# Patient Record
Sex: FEMALE | Race: BLACK OR AFRICAN AMERICAN | NOT HISPANIC OR LATINO | Employment: STUDENT | URBAN - METROPOLITAN AREA
[De-identification: names, ages, dates, MRNs, and addresses within clinical notes are randomized per-mention and may not be internally consistent; named-entity substitution may affect disease eponyms.]

---
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
## 2024-04-23 ENCOUNTER — OFFICE VISIT (OUTPATIENT)
Dept: OBSTETRICS AND GYNECOLOGY | Age: 19
End: 2024-04-23
Payer: COMMERCIAL

## 2024-04-23 VITALS
HEIGHT: 61 IN | DIASTOLIC BLOOD PRESSURE: 74 MMHG | BODY MASS INDEX: 36.17 KG/M2 | WEIGHT: 191.6 LBS | SYSTOLIC BLOOD PRESSURE: 112 MMHG

## 2024-04-23 DIAGNOSIS — Z01.419 ENCOUNTER FOR GYNECOLOGICAL EXAMINATION: Primary | ICD-10-CM

## 2024-04-23 DIAGNOSIS — Z20.2 EXPOSURE TO STD: ICD-10-CM

## 2024-04-23 RX ORDER — CETIRIZINE HYDROCHLORIDE 10 MG/1
10 TABLET ORAL DAILY
COMMUNITY

## 2024-04-23 NOTE — PROGRESS NOTES
"Subjective     Chief Complaint   Patient presents with    Gynecologic Exam     Annual exam, Last Annual 2023, Pt has no complaints today, Doing well        History of Present Illness    Amarilis Mak is a 18 y.o.  who presents for annual exam.  Her menses are monthly about every 30 days;  lasting 4-7 days, dysmenorrhea none     She stopped ocp's as she is no longer in relationship with a male. She would like STD screening today.  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     Obstetric History:  OB History          0    Para   0    Term   0       0    AB   0    Living   0         SAB   0    IAB   0    Ectopic   0    Molar   0    Multiple   0    Live Births   0               Menstrual History:     Patient's last menstrual period was 2024 (exact date).         Current contraception: currently female partners  History of abnormal Pap smear:  N/A  Received Gardasil immunization: no  Perform regular self breast exam: no  Family history of uterine or ovarian cancer: no  Family History of colon cancer: no  Family history of breast cancer: no    Mammogram: not indicated.  Colonoscopy: not indicated.  DEXA: not indicated.    Exercise: moderately active  Calcium/Vitamin D: adequate intake    The following portions of the patient's history were reviewed and updated as appropriate: allergies, current medications, past family history, past medical history, past social history, past surgical history, and problem list.    Review of Systems    Review of Systems   Constitutional: Negative for fatigue.   Respiratory: Negative for shortness of breath.    Gastrointestinal: Negative for abdominal pain.   Genitourinary: Negative for abnormal bleeding  Neurological: Negative for severe headaches.   Psychiatric/Behavioral: Negative for dysphoric mood.         Objective   Physical Exam    /74   Ht 154.9 cm (61\")   Wt 86.9 kg (191 lb 9.6 oz)   LMP 2024 " (Exact Date)   BMI 36.20 kg/m²   General:   Alert, in no distress   Heart: regular rate and rhythm   Lungs: clear to auscultation bilaterally   Breast: Inspection is negative. Left breast is without masses, retractions, nipple discharge or axillary adenopathy. Right breast is without masses, retractions, nipple discharge or axillary adenopathy.     Neck: Supple, no thyromegaly   Abdomen: Soft, no tenderness or guarding   Pelvis: External genitalia: normal general appearance  Urinary system: urethral meatus normal  Vaginal: normal mucosa without prolapse or lesions  Cervix: normal appearance  Adnexa: no masses or tenderness  Uterus: normal, nontender   Extremities: Normal without edema   Neurologic: Alert and oriented   Psychiatric: Normal affect, judgment and mood     Assessment & Plan   Diagnoses and all orders for this visit:    1. Encounter for gynecological examination (Primary)    2. Exposure to STD  -     Chlamydia trachomatis, Neisseria gonorrhoeae, Trichomonas vaginalis, PCR - Swab, Vagina        All questions answered.  Breast self exam technique reviewed and patient encouraged to perform self-exam monthly.  Discussed healthy lifestyle modifications.  Recommended 30 minutes of aerobic exercise five times per week.  Advised her to call if she does not receive results of STD screen, she requests blood testing as well.

## 2025-01-21 ENCOUNTER — OFFICE VISIT (OUTPATIENT)
Dept: OBSTETRICS AND GYNECOLOGY | Age: 20
End: 2025-01-21
Payer: COMMERCIAL

## 2025-01-21 VITALS
HEIGHT: 61 IN | BODY MASS INDEX: 38.14 KG/M2 | SYSTOLIC BLOOD PRESSURE: 114 MMHG | DIASTOLIC BLOOD PRESSURE: 70 MMHG | WEIGHT: 202 LBS

## 2025-01-21 DIAGNOSIS — N92.6 IRREGULAR MENSES: Primary | ICD-10-CM

## 2025-01-21 LAB
B-HCG UR QL: NEGATIVE
EXPIRATION DATE: NORMAL
INTERNAL NEGATIVE CONTROL: NORMAL
INTERNAL POSITIVE CONTROL: NORMAL
Lab: NORMAL

## 2025-01-21 PROCEDURE — 81025 URINE PREGNANCY TEST: CPT | Performed by: PHYSICIAN ASSISTANT

## 2025-01-21 PROCEDURE — 99213 OFFICE O/P EST LOW 20 MIN: CPT | Performed by: PHYSICIAN ASSISTANT

## 2025-01-21 NOTE — PROGRESS NOTES
"Subjective     Chief Complaint   Patient presents with    Follow-up     Gyn follow up for possible pcos, ultrasound done today.        Amarilis Mak is a 19 y.o.  whose LMP is Patient's last menstrual period was 01/10/2025 (exact date). presents with irregular menses    Had a normal period on the   Previously had a period in -  Had a normal period then also  -  Prior to that her periods had all been regular    Denies any other symptoms    She was on a pill for a while but stopped d/t not being SA    Has pgm with h/o thyroid and diabetes    Mom is present today and pt is agreeable to her being present    No Additional Complaints Reported    The following portions of the patient's history were reviewed and updated as appropriate:no additional history reviewed, vital signs, allergies, current medications, past medical history, past social history, past surgical history, and problem list      Review of Systems   Genitourinary:positive for abnormal menstrual periods     Objective      /70   Ht 154.9 cm (61\")   Wt 91.6 kg (202 lb)   LMP 01/10/2025 (Exact Date)   BMI 38.17 kg/m²     Physical Exam    General:   alert, comfortable, and no distress   Heart: Not performed today   Lungs: Not performed today.   Breast: Not performed today   Neck: Not performed today   Abdomen: Not performed today   CVA: Not performed today   Pelvis: Not performed today   Extremities: Not performed today   Neurologic: negative   Psychiatric: Normal affect, judgement, and mood       Lab Review   Labs: Urine pregnancy test    Imaging   Ultrasound - Pelvic Vaginal  1.  Uterus: Normal size, with uterine volume of 52 ml, and with uterine dimensions 6 x 5 x 3 cm     2.  Endometrium: Normal non-menopausal thickness and 7 mm     3.  Myometrium: Normal homogenous texture     4.  Ovaries  Left:    Mostly unremarkable and Normal small follicles  Right:  Normal/unremarkable     Assessment & Plan "     ASSESSMENT  1. Irregular menses          PLAN  1.   Orders Placed This Encounter   Procedures    TSH    Testosterone Free Direct    Prolactin    DHEA-Sulfate    Follicle Stimulating Hormone    Luteinizing Hormone    Comprehensive Metabolic Panel    Hemoglobin A1c    POC Pregnancy, Urine       2. Disc that her u/s findings are not strongly suspicious for PCOS, Her left ovary has some follicle notes. Right ovary appears normal. She has no hirsutism, acne, acanthosis nigricans or weight changes. We will plan labs today. I did discuss options for treating irregular menses to include-bcp, iud, provera withdrawal or metformin (if labs support the dx of pcos). She will wait for her labs and let us know how she would like to proceed    Of note, mom is a physician and would like to wait on labs prior to tx as well. She requests a CMP today    Follow up: 3 month(s) as scheduled    FLOR Benitez  1/21/2025

## 2025-01-22 LAB
ALBUMIN SERPL-MCNC: 4.2 G/DL (ref 3.5–5.2)
ALBUMIN/GLOB SERPL: 1.1 G/DL
ALP SERPL-CCNC: 107 U/L (ref 39–117)
ALT SERPL-CCNC: 13 U/L (ref 1–33)
AST SERPL-CCNC: 21 U/L (ref 1–32)
BILIRUB SERPL-MCNC: 0.3 MG/DL (ref 0–1.2)
BUN SERPL-MCNC: 6 MG/DL (ref 6–20)
BUN/CREAT SERPL: 8.3 (ref 7–25)
CALCIUM SERPL-MCNC: 10.1 MG/DL (ref 8.6–10.5)
CHLORIDE SERPL-SCNC: 100 MMOL/L (ref 98–107)
CO2 SERPL-SCNC: 22.9 MMOL/L (ref 22–29)
CREAT SERPL-MCNC: 0.72 MG/DL (ref 0.57–1)
DHEA-S SERPL-MCNC: 95.7 UG/DL (ref 110–431.7)
EGFRCR SERPLBLD CKD-EPI 2021: 122.9 ML/MIN/1.73
FSH SERPL-ACNC: 5.9 MIU/ML
GLOBULIN SER CALC-MCNC: 3.9 GM/DL
GLUCOSE SERPL-MCNC: 93 MG/DL (ref 65–99)
HBA1C MFR BLD: 5.4 % (ref 4.8–5.6)
LH SERPL-ACNC: 9.9 MIU/ML
POTASSIUM SERPL-SCNC: 4.3 MMOL/L (ref 3.5–5.2)
PROLACTIN SERPL-MCNC: 12.6 NG/ML (ref 4.8–33.4)
PROT SERPL-MCNC: 8.1 G/DL (ref 6–8.5)
SODIUM SERPL-SCNC: 138 MMOL/L (ref 136–145)
TESTOST FREE SERPL-MCNC: 0.6 PG/ML (ref 0–4.2)
TSH SERPL DL<=0.005 MIU/L-ACNC: 2 UIU/ML (ref 0.27–4.2)

## 2025-01-27 ENCOUNTER — TELEPHONE (OUTPATIENT)
Dept: OBSTETRICS AND GYNECOLOGY | Age: 20
End: 2025-01-27

## 2025-04-29 ENCOUNTER — OFFICE VISIT (OUTPATIENT)
Dept: OBSTETRICS AND GYNECOLOGY | Age: 20
End: 2025-04-29
Payer: COMMERCIAL

## 2025-04-29 VITALS
SYSTOLIC BLOOD PRESSURE: 122 MMHG | DIASTOLIC BLOOD PRESSURE: 84 MMHG | WEIGHT: 202.2 LBS | BODY MASS INDEX: 38.18 KG/M2 | HEIGHT: 61 IN

## 2025-04-29 DIAGNOSIS — N91.4 SECONDARY OLIGOMENORRHEA: ICD-10-CM

## 2025-04-29 DIAGNOSIS — Z20.2 EXPOSURE TO STD: ICD-10-CM

## 2025-04-29 DIAGNOSIS — Z01.419 ENCOUNTER FOR GYNECOLOGICAL EXAMINATION: Primary | ICD-10-CM

## 2025-04-29 DIAGNOSIS — N92.6 IRREGULAR MENSES: ICD-10-CM

## 2025-04-29 RX ORDER — GUAIFENESIN 600 MG/1
1200 TABLET, EXTENDED RELEASE ORAL 2 TIMES DAILY
COMMUNITY

## 2025-04-29 NOTE — PROGRESS NOTES
Subjective     Chief Complaint   Patient presents with    Gynecologic Exam     Annual Exam. Patient has concerns about irregular period's, and possibly having PCOS.       History of Present Illness    Amarilis Mak is a 19 y.o.  who presents for annual exam.  Her menses have been irregular since last August. Menses are not monthly, the longest she has gone between cycles is 3 months. Flow is normal when she has a menses.   She would like STD screening today.     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     Obstetric History:  OB History          0    Para   0    Term   0       0    AB   0    Living   0         SAB   0    IAB   0    Ectopic   0    Molar   0    Multiple   0    Live Births   0               Menstrual History:     Patient's last menstrual period was 2025 (exact date).         Current contraception: abstinence since 2024  History of abnormal Pap smear:  n/a  Received Gardasil immunization: no, pt confirmed with mother  Perform regular self breast exam:  yes  Family history of uterine or ovarian cancer: no  Family History of colon cancer: no  Family history of breast cancer: yes - mat great GM    Mammogram: not indicated.  Colonoscopy: not indicated.  DEXA: not indicated.    Exercise: moderately active  Calcium/Vitamin D: adequate intake    The following portions of the patient's history were reviewed and updated as appropriate: allergies, current medications, past family history, past medical history, past social history, past surgical history, and problem list.    Review of Systems    Review of Systems   Constitutional: Negative for fatigue.   Respiratory: Negative for shortness of breath.    Gastrointestinal: Negative for abdominal pain.   Genitourinary: Positive for irregular menses  Neurological: Negative for severe headaches.   Psychiatric/Behavioral: Negative for dysphoric mood.         Objective   Physical Exam    /84  "(BP Location: Right arm, Patient Position: Sitting, Cuff Size: Adult)   Ht 154.9 cm (61\")   Wt 91.7 kg (202 lb 3.2 oz)   LMP 03/30/2025 (Exact Date)   BMI 38.21 kg/m²   General:   Alert, in no distress   Heart: regular rate and rhythm   Lungs: clear to auscultation bilaterally   Breast: Inspection is negative. Left breast is without masses, retractions, nipple discharge or axillary adenopathy. Right breast is without masses, retractions, nipple discharge or axillary adenopathy.     Neck: Supple, no thyromegaly   Abdomen: Soft, no tenderness or guarding   Pelvis: External genitalia: normal general appearance  Urinary system: urethral meatus normal  Vaginal: normal mucosa without prolapse or lesions  Cervix: normal appearance  Adnexa: no masses or tenderness  Uterus: normal, nontender   Extremities: Normal without edema   Neurologic: Alert and oriented   Psychiatric: Normal affect, judgment and mood     Assessment & Plan   Diagnoses and all orders for this visit:    1. Encounter for gynecological examination (Primary)  -     HPV Vaccine (HPV9)    2. Exposure to STD  -     Chlamydia trachomatis, Neisseria gonorrhoeae, Trichomonas vaginalis, PCR - Swab, Vagina  -     Hepatitis B Surface Antigen  -     RPR Qualitative with Reflex to Quant  -     HIV-1 / O / 2 Ag / Antibody    3. Secondary oligomenorrhea    4. Irregular menses  -     POC Pregnancy, Urine        All questions answered.  Breast self exam technique reviewed and patient encouraged to perform self-exam monthly.  Discussed healthy lifestyle modifications.  Recommended 30 minutes of aerobic exercise five times per week.  Advised pt to call if she does not receive results of STD screen within 1 wk.  Pap def as not yet indicated    Discussed oligomenorrhea management. Pt not interested in combined ocp's for management. She does not currently need contraception and notes she might start back on pills if she needs contraception in the future. Reviewed risks of " prolonged missed menses to include endometrial hyperplasia/cancer/heavy anovulatory bleeding. Pt declines rx at this time but will call if she has prolonged misses menses.

## 2025-06-30 ENCOUNTER — CLINICAL SUPPORT (OUTPATIENT)
Dept: OBSTETRICS AND GYNECOLOGY | Age: 20
End: 2025-06-30
Payer: COMMERCIAL

## 2025-06-30 DIAGNOSIS — Z23 NEED FOR HPV VACCINE: Primary | ICD-10-CM
